# Patient Record
Sex: MALE | Race: WHITE | Employment: FULL TIME | URBAN - METROPOLITAN AREA
[De-identification: names, ages, dates, MRNs, and addresses within clinical notes are randomized per-mention and may not be internally consistent; named-entity substitution may affect disease eponyms.]

---

## 2017-05-13 ENCOUNTER — HOSPITAL ENCOUNTER (OUTPATIENT)
Age: 37
Discharge: HOME | End: 2017-05-13
Payer: COMMERCIAL

## 2017-05-13 VITALS
TEMPERATURE: 99 F | RESPIRATION RATE: 16 BRPM | OXYGEN SATURATION: 100 % | SYSTOLIC BLOOD PRESSURE: 107 MMHG | BODY MASS INDEX: 21.22 KG/M2 | HEIGHT: 68 IN | DIASTOLIC BLOOD PRESSURE: 73 MMHG | HEART RATE: 74 BPM | WEIGHT: 140 LBS

## 2017-05-13 DIAGNOSIS — B02.9 HERPES ZOSTER WITHOUT COMPLICATION: Primary | ICD-10-CM

## 2017-05-13 PROCEDURE — 99204 OFFICE O/P NEW MOD 45 MIN: CPT

## 2017-05-13 PROCEDURE — 99213 OFFICE O/P EST LOW 20 MIN: CPT

## 2017-05-13 RX ORDER — ACETAMINOPHEN AND CODEINE PHOSPHATE 300; 30 MG/1; MG/1
1-2 TABLET ORAL EVERY 4 HOURS PRN
Qty: 12 TABLET | Refills: 0 | Status: SHIPPED | OUTPATIENT
Start: 2017-05-13 | End: 2017-05-20

## 2017-05-13 RX ORDER — VALACYCLOVIR HYDROCHLORIDE 1 G/1
1 TABLET, FILM COATED ORAL 3 TIMES DAILY
Qty: 21 TABLET | Refills: 0 | Status: SHIPPED | OUTPATIENT
Start: 2017-05-13 | End: 2017-05-20

## 2017-05-13 NOTE — ED INITIAL ASSESSMENT (HPI)
Pt with raised red blister like rash on left anterior and posterior chest wall. Ariana JUAREZ at bedside. Reports occasional headache. Denies additional symptoms.

## 2017-05-13 NOTE — ED PROVIDER NOTES
Patient Seen in: [address] [place] [address]    History   Patient presents with:  Rash Skin Problem (integumentary)    Stated Complaint: Shingles    HPI    Patient is a 13-year-old male who presents for evaluation of rash to his right maureen Positive for stated complaint: Shingles  Other systems are as noted in HPI. Constitutional and vital signs reviewed. All other systems reviewed and negative except as noted above.     PSFH elements reviewed from today and agreed except as otherwise st VS, pt appears nontoxic. PE as above, c/w shingles. Will prescribe Valtrex, told patient because it has been 4 days he may not notice a difference with the medication. Requesting pain medication.   Limited Tylenol #3 dispensed, told to take at night, do n

## 2017-05-15 ENCOUNTER — TELEPHONE (OUTPATIENT)
Dept: FAMILY MEDICINE CLINIC | Facility: CLINIC | Age: 37
End: 2017-05-15

## 2017-05-15 NOTE — TELEPHONE ENCOUNTER
Dr.DBC Dr.CMC pt was Dx with Shingles at [facility] on 5/13. He wanted to know how contagious this was. Pt was inform its contact. The spread through direct contact with the fluid. Once he develops crust he is no longer contagious.  He was advised to avoid being a

## 2017-05-15 NOTE — TELEPHONE ENCOUNTER
Pt went to urgent care on sat may 13, 2017 and they advise he had shingles. Pt wants to know what he should do regarding having shingles. .. please advise

## 2017-05-16 NOTE — TELEPHONE ENCOUNTER
Alexander Reid pt was inform that he should wait for the Shingles vaccine until the age 49 pt verbalized understanding.     Alexander Reid pt stated that yesterday he started to feel the pain shooting down his right leg the same side as where he has the shingles is this no

## 2017-05-16 NOTE — TELEPHONE ENCOUNTER
Discussed Dr Scott 16 response with patient. Patient states he feels better this evening. He is to continue taking his valtrex and pain meds. If symptoms worsen, patient to f/u in office.

## 2019-08-08 ENCOUNTER — HOSPITAL ENCOUNTER (OUTPATIENT)
Dept: MRI IMAGING | Age: 39
Discharge: HOME | End: 2019-08-08
Attending: INTERNAL MEDICINE
Payer: COMMERCIAL

## 2019-08-08 DIAGNOSIS — G44.019 EPISODIC CLUSTER HEADACHE, NOT INTRACTABLE: ICD-10-CM

## 2019-08-08 PROCEDURE — 70551 MRI BRAIN STEM W/O DYE: CPT | Performed by: INTERNAL MEDICINE

## 2020-04-19 ENCOUNTER — HOSPITAL ENCOUNTER (EMERGENCY)
Age: 40
Discharge: HOME | End: 2020-04-19
Attending: EMERGENCY MEDICINE

## 2020-04-19 VITALS
HEART RATE: 76 BPM | DIASTOLIC BLOOD PRESSURE: 72 MMHG | SYSTOLIC BLOOD PRESSURE: 118 MMHG | RESPIRATION RATE: 16 BRPM | OXYGEN SATURATION: 100 % | BODY MASS INDEX: 21.91 KG/M2 | TEMPERATURE: 97.3 F | WEIGHT: 147.93 LBS | HEIGHT: 69 IN

## 2020-04-19 DIAGNOSIS — G43.909 MIGRAINE WITHOUT STATUS MIGRAINOSUS, NOT INTRACTABLE, UNSPECIFIED MIGRAINE TYPE: Primary | ICD-10-CM

## 2020-04-19 LAB
ALBUMIN SERPL-MCNC: 4.3 G/DL (ref 3.6–5.1)
ALBUMIN/GLOB SERPL: 1.2 {RATIO} (ref 1–2.4)
ALP SERPL-CCNC: 36 UNITS/L (ref 45–117)
ALT SERPL-CCNC: 35 UNITS/L
ANION GAP SERPL CALC-SCNC: 14 MMOL/L (ref 10–20)
AST SERPL-CCNC: 24 UNITS/L
BASOPHILS # BLD: 0.1 K/MCL (ref 0–0.3)
BASOPHILS NFR BLD: 1 %
BILIRUB SERPL-MCNC: 1.3 MG/DL (ref 0.2–1)
BUN SERPL-MCNC: 13 MG/DL (ref 6–20)
BUN/CREAT SERPL: 15 (ref 7–25)
CALCIUM SERPL-MCNC: 9.3 MG/DL (ref 8.4–10.2)
CHLORIDE SERPL-SCNC: 106 MMOL/L (ref 98–107)
CO2 SERPL-SCNC: 20 MMOL/L (ref 21–32)
CREAT SERPL-MCNC: 0.87 MG/DL (ref 0.67–1.17)
DIFFERENTIAL METHOD BLD: ABNORMAL
EOSINOPHIL # BLD: 0.1 K/MCL (ref 0.1–0.5)
EOSINOPHIL NFR BLD: 1 %
ERYTHROCYTE [DISTWIDTH] IN BLOOD: 16.6 % (ref 11–15)
GLOBULIN SER-MCNC: 3.5 G/DL (ref 2–4)
GLUCOSE SERPL-MCNC: 170 MG/DL (ref 65–99)
HCT VFR BLD CALC: 36 % (ref 39–51)
HGB BLD-MCNC: 11.6 G/DL (ref 13–17)
IMM GRANULOCYTES # BLD AUTO: 0 K/MCL (ref 0–0.2)
IMM GRANULOCYTES NFR BLD: 0 %
LYMPHOCYTES # BLD: 2.6 K/MCL (ref 1–4.8)
LYMPHOCYTES NFR BLD: 23 %
MCH RBC QN AUTO: 19.5 PG (ref 26–34)
MCHC RBC AUTO-ENTMCNC: 32.2 G/DL (ref 32–36.5)
MCV RBC AUTO: 60.4 FL (ref 78–100)
MONOCYTES # BLD: 0.7 K/MCL (ref 0.3–0.9)
MONOCYTES NFR BLD: 6 %
NEUTROPHILS # BLD: 7.8 K/MCL (ref 1.8–7.7)
NEUTROPHILS NFR BLD: 69 %
NRBC BLD MANUAL-RTO: 0 /100 WBC
PLATELET # BLD: 209 K/MCL (ref 140–450)
POTASSIUM SERPL-SCNC: 3.5 MMOL/L (ref 3.4–5.1)
PROT SERPL-MCNC: 7.8 G/DL (ref 6.4–8.2)
RBC # BLD: 5.96 MIL/MCL (ref 4.5–5.9)
SODIUM SERPL-SCNC: 136 MMOL/L (ref 135–145)
WBC # BLD: 11.2 K/MCL (ref 4.2–11)

## 2020-04-19 PROCEDURE — 99284 EMERGENCY DEPT VISIT MOD MDM: CPT

## 2020-04-19 PROCEDURE — 96361 HYDRATE IV INFUSION ADD-ON: CPT

## 2020-04-19 PROCEDURE — 85025 COMPLETE CBC W/AUTO DIFF WBC: CPT

## 2020-04-19 PROCEDURE — 10002800 HB RX 250 W HCPCS: Performed by: EMERGENCY MEDICINE

## 2020-04-19 PROCEDURE — 10002807 HB RX 258: Performed by: EMERGENCY MEDICINE

## 2020-04-19 PROCEDURE — 96375 TX/PRO/DX INJ NEW DRUG ADDON: CPT

## 2020-04-19 PROCEDURE — 96374 THER/PROPH/DIAG INJ IV PUSH: CPT

## 2020-04-19 PROCEDURE — 80053 COMPREHEN METABOLIC PANEL: CPT

## 2020-04-19 RX ORDER — DIPHENHYDRAMINE HYDROCHLORIDE 50 MG/ML
25 INJECTION INTRAMUSCULAR; INTRAVENOUS ONCE
Status: COMPLETED | OUTPATIENT
Start: 2020-04-19 | End: 2020-04-19

## 2020-04-19 RX ORDER — PROCHLORPERAZINE EDISYLATE 5 MG/ML
10 INJECTION INTRAMUSCULAR; INTRAVENOUS ONCE
Status: COMPLETED | OUTPATIENT
Start: 2020-04-19 | End: 2020-04-19

## 2020-04-19 RX ORDER — BUTALBITAL, ACETAMINOPHEN AND CAFFEINE 50; 325; 40 MG/1; MG/1; MG/1
1 TABLET ORAL EVERY 4 HOURS PRN
Qty: 12 TABLET | Refills: 0 | Status: SHIPPED | OUTPATIENT
Start: 2020-04-19

## 2020-04-19 RX ADMIN — PROCHLORPERAZINE EDISYLATE 10 MG: 5 INJECTION INTRAMUSCULAR; INTRAVENOUS at 08:30

## 2020-04-19 RX ADMIN — SODIUM CHLORIDE 1000 ML: 9 INJECTION, SOLUTION INTRAVENOUS at 08:29

## 2020-04-19 RX ADMIN — KETOROLAC TROMETHAMINE 15 MG: 30 INJECTION, SOLUTION INTRAMUSCULAR; INTRAVENOUS at 08:30

## 2020-04-19 RX ADMIN — DIPHENHYDRAMINE HYDROCHLORIDE 25 MG: 50 INJECTION, SOLUTION INTRAMUSCULAR; INTRAVENOUS at 08:30

## 2020-04-19 ASSESSMENT — ENCOUNTER SYMPTOMS
NAUSEA: 1
COUGH: 0
PHOTOPHOBIA: 1
HEADACHES: 1
PSYCHIATRIC NEGATIVE: 1
NUMBNESS: 1
CONSTITUTIONAL NEGATIVE: 1

## 2020-04-19 ASSESSMENT — PAIN SCALES - GENERAL
PAINLEVEL_OUTOF10: 0
PAINLEVEL_OUTOF10: 10

## 2021-02-08 ENCOUNTER — OFFICE VISIT (OUTPATIENT)
Dept: FAMILY MEDICINE CLINIC | Facility: CLINIC | Age: 41
End: 2021-02-08
Payer: COMMERCIAL

## 2021-02-08 VITALS
BODY MASS INDEX: 22.32 KG/M2 | SYSTOLIC BLOOD PRESSURE: 120 MMHG | WEIGHT: 149 LBS | HEART RATE: 83 BPM | DIASTOLIC BLOOD PRESSURE: 76 MMHG | HEIGHT: 68.5 IN

## 2021-02-08 DIAGNOSIS — G44.009 MIGRAINE-CLUSTER HEADACHE SYNDROME: ICD-10-CM

## 2021-02-08 DIAGNOSIS — Z80.0 FAMILY HISTORY OF COLON CANCER: ICD-10-CM

## 2021-02-08 DIAGNOSIS — B35.1 ONYCHOMYCOSIS: ICD-10-CM

## 2021-02-08 DIAGNOSIS — Z00.00 ROUTINE PHYSICAL EXAMINATION: Primary | ICD-10-CM

## 2021-02-08 PROCEDURE — 3074F SYST BP LT 130 MM HG: CPT | Performed by: FAMILY MEDICINE

## 2021-02-08 PROCEDURE — 3008F BODY MASS INDEX DOCD: CPT | Performed by: FAMILY MEDICINE

## 2021-02-08 PROCEDURE — 99385 PREV VISIT NEW AGE 18-39: CPT | Performed by: FAMILY MEDICINE

## 2021-02-08 PROCEDURE — 3078F DIAST BP <80 MM HG: CPT | Performed by: FAMILY MEDICINE

## 2021-02-08 PROCEDURE — 90715 TDAP VACCINE 7 YRS/> IM: CPT | Performed by: FAMILY MEDICINE

## 2021-02-08 PROCEDURE — 90471 IMMUNIZATION ADMIN: CPT | Performed by: FAMILY MEDICINE

## 2021-02-08 RX ORDER — SODIUM, POTASSIUM,MAG SULFATES 17.5-3.13G
SOLUTION, RECONSTITUTED, ORAL ORAL
Qty: 1 BOTTLE | Refills: 0 | Status: CANCELLED | OUTPATIENT
Start: 2021-02-08

## 2021-02-08 NOTE — PROGRESS NOTES
REASON FOR VISIT:    Guerita Hwang is a 45year old male who presents for an [address]. Feels well history of migraine cluster headaches. Has been the [facility] previously.   Has not had issues with his headache since la for: RPR   Hepatitis C Screening Screen pts at high risk plus screen one time for adults born [address]  No results found for: HCVAB   Tuberculosis Screen If high risk No components found for: [address] [facility] or P 120/76   Pulse 83   Ht 5' 8.5\" (1.74 m)   Wt 149 lb (67.6 kg)   BMI 22.33 kg/m²   > BP Readings from Last 3 Encounters:  02/08/21 : 120/76  05/13/17 : 107/73  10/04/16 : 112/75       GENERAL: well developed, well nourished, in no apparent distress   SKIN: years   HPV Males 11-21   Zoster (Shingles) 61 and older: one dose   Varicella 2 doses if not immune   MMR 1-2 doses if born after 26 and not immune   1.  Routine physical examination  Tetanus today  - LIPID PANEL  - TSH W REFLEX TO FREE T4  - GLUCOSE, SE

## 2021-02-08 NOTE — H&P
[address] [facility] [address] Gastroenterology                                                                                                  Clinic History and Physical     Pa Grandmother         lung   • Diabetes Maternal Grandfather    • Heart Disease Paternal Grandmother         CAD   • Diabetes Maternal Uncle       Social History: Social History    Tobacco Use      Smoking status: Never Smoker      Smokeless tobacco: Never U extremities   Psych: Pt has a normal mood and affect, behavior is normal    Nursing note and vitals reviewed      Labs/Imaging:     Patient's labs and imaging were reviewed and discussed with patient today. See HPI and A&P for further details.       ASSESS with the patient [who demonstrated understanding], including but not limited to the risks of bleeding, infection, pain, as well as the risks of anesthesia and perforation all leading to prolonged hospitalization or surgical intervention.  Miss rate of colon

## 2021-02-09 LAB
ALT: 26 U/L (ref 9–46)
AST: 16 U/L (ref 10–40)
CHOL/HDLC RATIO: 3 (CALC)
CHOLESTEROL, TOTAL: 127 MG/DL
GLUCOSE: 84 MG/DL (ref 65–139)
HDL CHOLESTEROL: 43 MG/DL
LDL-CHOLESTEROL: 72 MG/DL (CALC)
NON-HDL CHOLESTEROL: 84 MG/DL (CALC)
TRIGLYCERIDES: 43 MG/DL
TSH W/REFLEX TO FT4: 0.54 MIU/L (ref 0.4–4.5)

## 2021-02-22 ENCOUNTER — TELEPHONE (OUTPATIENT)
Dept: GASTROENTEROLOGY | Facility: CLINIC | Age: 41
End: 2021-02-22

## 2021-02-22 ENCOUNTER — OFFICE VISIT (OUTPATIENT)
Dept: GASTROENTEROLOGY | Facility: CLINIC | Age: 41
End: 2021-02-22
Payer: COMMERCIAL

## 2021-02-22 VITALS
BODY MASS INDEX: 21.22 KG/M2 | DIASTOLIC BLOOD PRESSURE: 71 MMHG | HEART RATE: 69 BPM | HEIGHT: 68 IN | WEIGHT: 140 LBS | SYSTOLIC BLOOD PRESSURE: 116 MMHG

## 2021-02-22 DIAGNOSIS — Z80.0 FHX: COLON CANCER: Primary | ICD-10-CM

## 2021-02-22 DIAGNOSIS — Z12.11 SCREENING FOR COLON CANCER: ICD-10-CM

## 2021-02-22 DIAGNOSIS — Z12.11 SCREEN FOR COLON CANCER: ICD-10-CM

## 2021-02-22 DIAGNOSIS — Z80.0 FAMILY HISTORY OF COLON CANCER: Primary | ICD-10-CM

## 2021-02-22 PROCEDURE — S0285 CNSLT BEFORE SCREEN COLONOSC: HCPCS | Performed by: NURSE PRACTITIONER

## 2021-02-22 PROCEDURE — 3078F DIAST BP <80 MM HG: CPT | Performed by: NURSE PRACTITIONER

## 2021-02-22 PROCEDURE — 3074F SYST BP LT 130 MM HG: CPT | Performed by: NURSE PRACTITIONER

## 2021-02-22 PROCEDURE — 3008F BODY MASS INDEX DOCD: CPT | Performed by: NURSE PRACTITIONER

## 2021-02-22 NOTE — TELEPHONE ENCOUNTER
Scheduled for:  Colonoscopy 19994  Provider Name:  Dr Luis Granados  Date:  04/27/2021  Location:  [facility]  Sedation:  MAC  Time:  0900 (pt is aware to arrive at 0800)    Prep:  Miralax  Meds/Allergies Reconciled?:  Physician reviewed  Diagnosis with codes:  Family h

## 2021-02-22 NOTE — PATIENT INSTRUCTIONS
-Schedule colonoscopy w/ Dr. Damian Parnell or Dr. Conner Pak with MAC  Dx: [facility] AT [place]  -Eligible for NE: No r/t use of cannabis   -Prep: Split dose  MiraLAX/Gatorade  -Anti-platelets and anti-coagulants: None  -Diabetes meds: None    ** If MAC @ [facility]/NE:    - HOLD ACE/

## 2021-04-07 ENCOUNTER — TELEPHONE (OUTPATIENT)
Dept: GASTROENTEROLOGY | Facility: CLINIC | Age: 41
End: 2021-04-07

## 2021-04-07 NOTE — TELEPHONE ENCOUNTER
Spouse spoke with [facility] - was told the CLN needs PA - they were also asking who the anesthesiologist  will be

## 2021-04-07 NOTE — TELEPHONE ENCOUNTER
Managed Care, can you please assist with obtaining a PA for a colonoscopy that is scheduled on  04/27.  Thank you

## 2021-04-08 NOTE — TELEPHONE ENCOUNTER
PPD, can you please assist with obtaining a PA for a colonoscopy. Patient is scheduled on  04/27.  Thank you

## 2021-04-08 NOTE — TELEPHONE ENCOUNTER
Please see referral #: [id number]. No PA required for CPT 44327 done at [facility] per [facility] (verified yesterday per [facility]). Referral has Decision ID if needed for patient records.

## 2021-04-08 NOTE — TELEPHONE ENCOUNTER
Patient's wife Greta Hartmann notified that no prior authorization is need for procedure per our PPD department.

## 2021-04-22 RX ORDER — CETIRIZINE HYDROCHLORIDE 10 MG/1
10 TABLET ORAL DAILY
COMMUNITY

## 2021-04-22 RX ORDER — GARLIC EXTRACT 500 MG
1 CAPSULE ORAL DAILY
COMMUNITY

## 2021-04-25 ENCOUNTER — LAB ENCOUNTER (OUTPATIENT)
Dept: LAB | Facility: HOSPITAL | Age: 41
End: 2021-04-25
Attending: INTERNAL MEDICINE
Payer: COMMERCIAL

## 2021-04-25 DIAGNOSIS — Z01.818 PRE-OP TESTING: ICD-10-CM

## 2021-04-26 ENCOUNTER — TELEPHONE (OUTPATIENT)
Dept: GASTROENTEROLOGY | Facility: CLINIC | Age: 41
End: 2021-04-26

## 2021-04-26 NOTE — TELEPHONE ENCOUNTER
Received call from answering service on priority line. Patient scheduled for procedure tomorrow and wanted to know if he could take Tylenol for headache. All questions answered and I reviewed clear liquid diet with the patient.

## 2021-04-27 ENCOUNTER — TELEPHONE (OUTPATIENT)
Dept: GASTROENTEROLOGY | Facility: CLINIC | Age: 41
End: 2021-04-27

## 2021-04-27 ENCOUNTER — ANESTHESIA EVENT (OUTPATIENT)
Dept: ENDOSCOPY | Age: 41
End: 2021-04-27
Payer: COMMERCIAL

## 2021-04-27 ENCOUNTER — ANESTHESIA (OUTPATIENT)
Dept: ENDOSCOPY | Age: 41
End: 2021-04-27
Payer: COMMERCIAL

## 2021-04-27 ENCOUNTER — HOSPITAL ENCOUNTER (OUTPATIENT)
Age: 41
Setting detail: HOSPITAL OUTPATIENT SURGERY
Discharge: HOME | End: 2021-04-27
Attending: INTERNAL MEDICINE | Admitting: INTERNAL MEDICINE
Payer: COMMERCIAL

## 2021-04-27 VITALS
WEIGHT: 140 LBS | HEIGHT: 68 IN | HEART RATE: 52 BPM | TEMPERATURE: 98 F | RESPIRATION RATE: 12 BRPM | DIASTOLIC BLOOD PRESSURE: 71 MMHG | BODY MASS INDEX: 21.22 KG/M2 | OXYGEN SATURATION: 100 % | SYSTOLIC BLOOD PRESSURE: 98 MMHG

## 2021-04-27 DIAGNOSIS — Z80.0 FHX: COLON CANCER: ICD-10-CM

## 2021-04-27 DIAGNOSIS — Z01.818 PRE-OP TESTING: Primary | ICD-10-CM

## 2021-04-27 DIAGNOSIS — Z12.11 SCREEN FOR COLON CANCER: ICD-10-CM

## 2021-04-27 PROCEDURE — 45378 DIAGNOSTIC COLONOSCOPY: CPT | Performed by: INTERNAL MEDICINE

## 2021-04-27 RX ORDER — SODIUM CHLORIDE, SODIUM LACTATE, POTASSIUM CHLORIDE, CALCIUM CHLORIDE 600; 310; 30; 20 MG/100ML; MG/100ML; MG/100ML; MG/100ML
INJECTION, SOLUTION INTRAVENOUS CONTINUOUS
Status: DISCONTINUED | OUTPATIENT
Start: 2021-04-27 | End: 2021-04-27

## 2021-04-27 RX ORDER — LIDOCAINE HYDROCHLORIDE 10 MG/ML
INJECTION, SOLUTION EPIDURAL; INFILTRATION; INTRACAUDAL; PERINEURAL AS NEEDED
Status: DISCONTINUED | OUTPATIENT
Start: 2021-04-27 | End: 2021-04-27 | Stop reason: SURG

## 2021-04-27 RX ADMIN — SODIUM CHLORIDE, SODIUM LACTATE, POTASSIUM CHLORIDE, CALCIUM CHLORIDE: 600; 310; 30; 20 INJECTION, SOLUTION INTRAVENOUS at 09:41:00

## 2021-04-27 RX ADMIN — SODIUM CHLORIDE, SODIUM LACTATE, POTASSIUM CHLORIDE, CALCIUM CHLORIDE: 600; 310; 30; 20 INJECTION, SOLUTION INTRAVENOUS at 09:12:00

## 2021-04-27 RX ADMIN — LIDOCAINE HYDROCHLORIDE 50 MG: 10 INJECTION, SOLUTION EPIDURAL; INFILTRATION; INTRACAUDAL; PERINEURAL at 09:12:00

## 2021-04-27 NOTE — ANESTHESIA PREPROCEDURE EVALUATION
Anesthesia PreOp Note    HPI:     Marine Clark is a 45year old male who presents for preoperative consultation requested by: Anjelica Dennis MD    Date of Surgery: 4/27/2021    Procedure(s):  COLONOSCOPY  Indication: FHx: colon cancer, Screen for c Spouse name: Not on file      Number of children: Not on file      Years of education: Not on file      Highest education level: Not on file    Occupational History      Not on file    Tobacco Use      Smoking status: Never Smoker      Smokeless tobacco: reviewed. Lab Results   Component Value Date    GLU 84 02/08/2021          Vital Signs: Body mass index is 21.29 kg/m². height is 1.727 m (5' 8\") and weight is 63.5 kg (140 lb). His tympanic temperature is 98.3 °F (36.8 °C).  His blood pressure is 1

## 2021-04-27 NOTE — H&P
History & Physical Examination    Patient Name: Francesco Farrell  MRN: [id number]  [facility]: [id number]  YOB: 1980    Diagnosis:     Family history of colon cancer  Colon cancer screening      TURMERIC OR, Take 1 tablet by mouth daily. , Disp: , R ] I have reviewed the History and Physical done within the last 30 days. Any changes noted above. Coleen Villarreal.  Maritza  4/27/2021  9:10 AM

## 2021-04-27 NOTE — TELEPHONE ENCOUNTER
Health maintenance updated. 5 year colonoscopy recall placed in patient outreach.  Next due on 04/27/2026 per Dr. Yani Andrea

## 2021-04-27 NOTE — ANESTHESIA POSTPROCEDURE EVALUATION
Patient: Francesco Farrell    Procedure Summary     Date: 04/27/21 Room / Location: [facility] ENDOSCOPY 01 / [facility] ENDO    Anesthesia Start: 0908 Anesthesia Stop: 7573    Procedure: COLONOSCOPY (N/A ) Diagnosis:       FHx: colon cancer      Screen for colon ca

## 2021-04-27 NOTE — OPERATIVE REPORT
[facility] Endoscopy Report      Preoperative Diagnosis:  - family history of colon cancer  - colon cancer screening      Postoperative Diagnosis:  - normal colonoscopy       Procedure:    Colonoscopy       Surgeon:  Eloina Lin M.D.

## 2021-06-03 ENCOUNTER — HOSPITAL ENCOUNTER (OUTPATIENT)
Age: 41
Discharge: HOME | End: 2021-06-03
Payer: COMMERCIAL

## 2021-06-03 VITALS
RESPIRATION RATE: 16 BRPM | SYSTOLIC BLOOD PRESSURE: 114 MMHG | OXYGEN SATURATION: 100 % | HEART RATE: 66 BPM | TEMPERATURE: 98 F | DIASTOLIC BLOOD PRESSURE: 68 MMHG

## 2021-06-03 DIAGNOSIS — J02.9 PHARYNGITIS, UNSPECIFIED ETIOLOGY: Primary | ICD-10-CM

## 2021-06-03 PROCEDURE — 87880 STREP A ASSAY W/OPTIC: CPT

## 2021-06-03 PROCEDURE — 99203 OFFICE O/P NEW LOW 30 MIN: CPT

## 2021-06-03 PROCEDURE — 99213 OFFICE O/P EST LOW 20 MIN: CPT

## 2021-06-03 RX ORDER — AMOXICILLIN 875 MG/1
875 TABLET, COATED ORAL 2 TIMES DAILY
Qty: 20 TABLET | Refills: 0 | Status: SHIPPED | OUTPATIENT
Start: 2021-06-03 | End: 2021-06-13

## 2021-06-03 NOTE — ED INITIAL ASSESSMENT (HPI)
2 days of sore throat. No fever. States his kids were dx with strep last week. Fully vaccinated for Covid.

## 2021-12-07 ENCOUNTER — IMMUNIZATION (OUTPATIENT)
Dept: LAB | Facility: HOSPITAL | Age: 41
End: 2021-12-07
Attending: EMERGENCY MEDICINE
Payer: COMMERCIAL

## 2021-12-07 DIAGNOSIS — Z23 NEED FOR VACCINATION: Primary | ICD-10-CM

## 2021-12-07 PROCEDURE — 0004A SARSCOV2 VAC 30MCG/0.3ML IM: CPT

## 2022-03-15 ENCOUNTER — OFFICE VISIT (OUTPATIENT)
Dept: FAMILY MEDICINE CLINIC | Facility: CLINIC | Age: 42
End: 2022-03-15
Payer: COMMERCIAL

## 2022-03-15 ENCOUNTER — LAB ENCOUNTER (OUTPATIENT)
Dept: LAB | Age: 42
End: 2022-03-15
Attending: FAMILY MEDICINE
Payer: COMMERCIAL

## 2022-03-15 VITALS
DIASTOLIC BLOOD PRESSURE: 74 MMHG | HEIGHT: 68 IN | HEART RATE: 73 BPM | SYSTOLIC BLOOD PRESSURE: 116 MMHG | BODY MASS INDEX: 21.37 KG/M2 | WEIGHT: 141 LBS

## 2022-03-15 DIAGNOSIS — Z83.2 FAMILY HISTORY OF FACTOR V LEIDEN MUTATION: ICD-10-CM

## 2022-03-15 DIAGNOSIS — M25.561 RIGHT KNEE PAIN, UNSPECIFIED CHRONICITY: ICD-10-CM

## 2022-03-15 DIAGNOSIS — Z00.00 ROUTINE PHYSICAL EXAMINATION: ICD-10-CM

## 2022-03-15 DIAGNOSIS — Z00.00 ROUTINE PHYSICAL EXAMINATION: Primary | ICD-10-CM

## 2022-03-15 PROBLEM — D68.51 FACTOR V LEIDEN (HCC): Status: ACTIVE | Noted: 2022-03-15

## 2022-03-15 LAB
CHOLEST SERPL-MCNC: 145 MG/DL (ref ?–200)
FASTING PATIENT GLUCOSE ANSWER: YES
FASTING PATIENT LIPID ANSWER: YES
GLUCOSE BLD-MCNC: 107 MG/DL (ref 70–99)
HDLC SERPL-MCNC: 46 MG/DL (ref 40–59)
LDLC SERPL CALC-MCNC: 90 MG/DL (ref ?–100)
NONHDLC SERPL-MCNC: 99 MG/DL (ref ?–130)
TRIGL SERPL-MCNC: 38 MG/DL (ref 30–149)
VLDLC SERPL CALC-MCNC: 6 MG/DL (ref 0–30)

## 2022-03-15 PROCEDURE — 3074F SYST BP LT 130 MM HG: CPT | Performed by: FAMILY MEDICINE

## 2022-03-15 PROCEDURE — 90686 IIV4 VACC NO PRSV 0.5 ML IM: CPT | Performed by: FAMILY MEDICINE

## 2022-03-15 PROCEDURE — 81241 F5 GENE: CPT

## 2022-03-15 PROCEDURE — 3078F DIAST BP <80 MM HG: CPT | Performed by: FAMILY MEDICINE

## 2022-03-15 PROCEDURE — 90471 IMMUNIZATION ADMIN: CPT | Performed by: FAMILY MEDICINE

## 2022-03-15 PROCEDURE — 80061 LIPID PANEL: CPT

## 2022-03-15 PROCEDURE — 36415 COLL VENOUS BLD VENIPUNCTURE: CPT

## 2022-03-15 PROCEDURE — 82947 ASSAY GLUCOSE BLOOD QUANT: CPT

## 2022-03-15 PROCEDURE — 99396 PREV VISIT EST AGE 40-64: CPT | Performed by: FAMILY MEDICINE

## 2022-03-15 PROCEDURE — 3008F BODY MASS INDEX DOCD: CPT | Performed by: FAMILY MEDICINE

## 2022-03-17 ENCOUNTER — HOSPITAL ENCOUNTER (OUTPATIENT)
Dept: GENERAL RADIOLOGY | Age: 42
Discharge: HOME | End: 2022-03-17
Attending: FAMILY MEDICINE
Payer: COMMERCIAL

## 2022-03-17 DIAGNOSIS — M25.561 RIGHT KNEE PAIN, UNSPECIFIED CHRONICITY: ICD-10-CM

## 2022-03-17 PROCEDURE — 73562 X-RAY EXAM OF KNEE 3: CPT | Performed by: FAMILY MEDICINE

## 2023-02-05 ENCOUNTER — IMMUNIZATION (OUTPATIENT)
Dept: LAB | Age: 43
End: 2023-02-05
Attending: EMERGENCY MEDICINE
Payer: COMMERCIAL

## 2023-02-05 DIAGNOSIS — Z23 NEED FOR VACCINATION: Primary | ICD-10-CM

## 2023-02-05 PROCEDURE — 0124A SARSCOV2 VAC BVL 30MCG/0.3ML: CPT

## 2023-10-03 ENCOUNTER — LAB ENCOUNTER (OUTPATIENT)
Dept: LAB | Age: 43
End: 2023-10-03
Attending: FAMILY MEDICINE
Payer: COMMERCIAL

## 2023-10-03 ENCOUNTER — OFFICE VISIT (OUTPATIENT)
Dept: FAMILY MEDICINE CLINIC | Facility: CLINIC | Age: 43
End: 2023-10-03

## 2023-10-03 VITALS
HEIGHT: 68 IN | HEART RATE: 81 BPM | BODY MASS INDEX: 22.58 KG/M2 | SYSTOLIC BLOOD PRESSURE: 101 MMHG | WEIGHT: 149 LBS | DIASTOLIC BLOOD PRESSURE: 64 MMHG

## 2023-10-03 DIAGNOSIS — Z00.00 ROUTINE PHYSICAL EXAMINATION: Primary | ICD-10-CM

## 2023-10-03 DIAGNOSIS — R73.01 IFG (IMPAIRED FASTING GLUCOSE): ICD-10-CM

## 2023-10-03 DIAGNOSIS — D68.51 FACTOR V LEIDEN (HCC): ICD-10-CM

## 2023-10-03 LAB
CHOLEST SERPL-MCNC: 147 MG/DL (ref ?–200)
EST. AVERAGE GLUCOSE BLD GHB EST-MCNC: 100 MG/DL (ref 68–126)
FASTING PATIENT GLUCOSE ANSWER: YES
FASTING PATIENT LIPID ANSWER: YES
GLUCOSE BLD-MCNC: 107 MG/DL (ref 70–99)
HBA1C MFR BLD: 5.1 % (ref ?–5.7)
HDLC SERPL-MCNC: 43 MG/DL (ref 40–59)
LDLC SERPL CALC-MCNC: 90 MG/DL (ref ?–100)
NONHDLC SERPL-MCNC: 104 MG/DL (ref ?–130)
TRIGL SERPL-MCNC: 68 MG/DL (ref 30–149)
VLDLC SERPL CALC-MCNC: 11 MG/DL (ref 0–30)

## 2023-10-03 PROCEDURE — 99396 PREV VISIT EST AGE 40-64: CPT | Performed by: FAMILY MEDICINE

## 2023-10-03 PROCEDURE — 80061 LIPID PANEL: CPT | Performed by: FAMILY MEDICINE

## 2023-10-03 PROCEDURE — 90471 IMMUNIZATION ADMIN: CPT | Performed by: FAMILY MEDICINE

## 2023-10-03 PROCEDURE — 3074F SYST BP LT 130 MM HG: CPT | Performed by: FAMILY MEDICINE

## 2023-10-03 PROCEDURE — 36415 COLL VENOUS BLD VENIPUNCTURE: CPT | Performed by: FAMILY MEDICINE

## 2023-10-03 PROCEDURE — 3078F DIAST BP <80 MM HG: CPT | Performed by: FAMILY MEDICINE

## 2023-10-03 PROCEDURE — 82947 ASSAY GLUCOSE BLOOD QUANT: CPT | Performed by: FAMILY MEDICINE

## 2023-10-03 PROCEDURE — 90686 IIV4 VACC NO PRSV 0.5 ML IM: CPT | Performed by: FAMILY MEDICINE

## 2023-10-03 PROCEDURE — 3008F BODY MASS INDEX DOCD: CPT | Performed by: FAMILY MEDICINE

## 2023-10-03 PROCEDURE — 83036 HEMOGLOBIN GLYCOSYLATED A1C: CPT | Performed by: FAMILY MEDICINE

## 2023-10-05 ENCOUNTER — PATIENT MESSAGE (OUTPATIENT)
Dept: FAMILY MEDICINE CLINIC | Facility: CLINIC | Age: 43
End: 2023-10-05

## 2023-10-06 NOTE — TELEPHONE ENCOUNTER
View All Conversations on this Encounter                 Patient Communication     Append Comments   Seen Back to Top    Fasting sugar slightly elevated otherwise labs normal.   Written by Alyssia Fernandez DO on 10/3/2023  5:04 PM CDT  Seen by patient Christopher Graf on 10/3/2023  9:48 PM

## 2024-08-06 ENCOUNTER — OFFICE VISIT (OUTPATIENT)
Dept: FAMILY MEDICINE CLINIC | Facility: CLINIC | Age: 44
End: 2024-08-06
Payer: COMMERCIAL

## 2024-08-06 VITALS
HEART RATE: 71 BPM | RESPIRATION RATE: 17 BRPM | SYSTOLIC BLOOD PRESSURE: 126 MMHG | DIASTOLIC BLOOD PRESSURE: 81 MMHG | WEIGHT: 148 LBS | BODY MASS INDEX: 22.43 KG/M2 | HEIGHT: 68 IN

## 2024-08-06 DIAGNOSIS — M54.6 CHRONIC THORACIC BACK PAIN, UNSPECIFIED BACK PAIN LATERALITY: Primary | ICD-10-CM

## 2024-08-06 DIAGNOSIS — G89.29 CHRONIC THORACIC BACK PAIN, UNSPECIFIED BACK PAIN LATERALITY: Primary | ICD-10-CM

## 2024-08-06 PROCEDURE — 99213 OFFICE O/P EST LOW 20 MIN: CPT | Performed by: FAMILY MEDICINE

## 2024-08-06 NOTE — PROGRESS NOTES
Blood pressure 126/81, pulse 71, resp. rate 17, height 5' 8\" (1.727 m), weight 148 lb (67.1 kg).          Patient presents today complaining of intermittent chronic midthoracic back pain.  Reports he drives up to an hour to work at times.  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  He denies any recent traumatic injuries.  No pain down the legs some pain down the arms.  Denies any bowel or bladder dysfunction.    Currently has no pain.    Objective    Back without CVA tenderness    No spinous process tenderness    Assessment chronic musculoskeletal thoracic back pain    Plan ergonomic information given also    Physical therapy referral entered    Follow-up if no improvement

## 2024-09-03 ENCOUNTER — TELEPHONE (OUTPATIENT)
Dept: PHYSICAL THERAPY | Facility: HOSPITAL | Age: 44
End: 2024-09-03

## 2024-09-04 ENCOUNTER — OFFICE VISIT (OUTPATIENT)
Dept: PHYSICAL THERAPY | Age: 44
End: 2024-09-04
Attending: FAMILY MEDICINE
Payer: COMMERCIAL

## 2024-09-04 DIAGNOSIS — G89.29 CHRONIC THORACIC BACK PAIN, UNSPECIFIED BACK PAIN LATERALITY: Primary | ICD-10-CM

## 2024-09-04 DIAGNOSIS — M54.6 CHRONIC THORACIC BACK PAIN, UNSPECIFIED BACK PAIN LATERALITY: Primary | ICD-10-CM

## 2024-09-04 PROCEDURE — 97530 THERAPEUTIC ACTIVITIES: CPT

## 2024-09-04 PROCEDURE — 97110 THERAPEUTIC EXERCISES: CPT

## 2024-09-04 PROCEDURE — 97161 PT EVAL LOW COMPLEX 20 MIN: CPT

## 2024-09-04 NOTE — PROGRESS NOTES
SPINE EVALUATION:     Diagnosis:   Chronic thoracic back pain, unspecified back pain laterality (M54.6,G89.29)       Referring Provider: Nathan Sandra  Date of Evaluation:    9/4/2024    Precautions:  None Next MD visit:   none scheduled  Date of Surgery: n/a     PATIENT SUMMARY   Walter Espino is a 44 year old male who presents to therapy today with complaints of right sided thoracic pain, insidious onset approximately 8-10 years ago. Progressively worse as it's present more often. Massage once a month helps temporarily. Pt describes discomfort as tightness, but can be sharp. - cough/sneeze. Comes and goes day to day.   Minimal working out, no weights, more cardio if anything.   Pt describes pain level current 2/10, at best 0/10, at worst 7/10. Worse with mornings intermittently or after prolonged sitting.  Better standing.  Current functional limitations include tolerates sitting 1/2 way through work day, washing dishes 20 minutes.     Walter describes prior level of function indep with all ADLs. Pt goals include \"try to get relief from back pain. Help get me in routine of exercises.\" Per pt  Past medical history was reviewed with Walter. Significant findings include none.  Pt denies diplopia, dysarthria, dysphasia, dizziness, drop attacks, bowel/bladder changes, saddle anesthesia, and CHEL LE N/T.    ASSESSMENT  Walter presents to physical therapy evaluation with primary c/o right sided thoracic back pain. The results of the objective tests and measures show mm tightness, weakness of right scapular stabilizers and postural mms, decreased lumbar lordosis.  Functional deficits include but are not limited to sitting, washing dishes.  Signs and symptoms are consistent with diagnosis of chronic thoracic back pain, soft tissue dysfunction. Pt and PT discussed evaluation findings, pathology, POC and HEP.  Pt voiced understanding and performs HEP correctly without reported pain. Skilled Physical Therapy is  medically necessary to address the above impairments and reach functional goals.     OBJECTIVE:   Observation/Posture: right handed.   Neuro Screen: Intact    Lumbar AROM: (* denotes performed with pain)  Flexion: WNL  Extension: Min decrease  Sidebending: R WNL; L WNL  Rotation: R WNL; L WNL    Accessory motion: NT  Palpation: TTP and spasm of right lower trapezius.    Strength: (* denotes performed with pain)  UE/Scapular   Shoulder Flex: R 5-/5, L 5/5  Shoulder ABD (C5): R 5-/5, L 5/5  Biceps (C6): R 5-/5, L 5/5  Wrist ext (C6): R NT/5, L NT/5  Triceps (C7): R 5-/5, L 5/5  Wrist Flex (C7): R NT/5, L NT/5  Digit Flex (C8): R NT/5, L NT/5  Thumb Ext (C8): R NT/5, L NT/5  Interossei (T1): R NT/5, L NT/5    Rhomboids: R 4/5, L 4+/5  Mid trap: R 4/5; L 4+/5  Lats: R 4/5, L 4+/5  Low trap: R 4/5; L 4+/5     Strength: R NT lbs; L NT lbs     Flexibility:   UE/Scapular   Upper Trap: R WFL; L WFL  Levator Scap: R WFL; L WFL  Pec Major: R NT; L NT  Scalenes: R NT, L NT     Special tests:   NT    Gait: pt ambulates on level ground with normal mechanics.    Today’s Treatment and Response:   Pt education was provided on exam findings, treatment diagnosis, treatment plan, expectations, and prognosis. Pt was also provided recommendations for activity modifications, modalities as needed [ice/heat], postural corrections, and importance of remaining active, ideas of how to incorporate HEP into daily life.  Patient was instructed in and issued a HEP for: seated FB/L rot stretch, prone and seated scap retr, tennis ball TPR.    Charges: PT Eval Low Complexity, TE, TA      Total Timed Treatment: 20 min     Total Treatment Time: 40 min     Based on clinical rationale and outcome measures, this evaluation involved Low Complexity decision making due to 1-2 personal factors/comorbidities, 3 body structures involved/activity limitations, and evolving symptoms including changing pain levels.  PLAN OF CARE:    Goals: (to be met in 10  visits)   - Pt indep with HEP 5 of 7 days a week.  - Pt demonstrates good posture throughout PT session without cues from PT.  - Scapular stabilizer MMT to increase by 1/2 to full grade to allow for sitting through 1/2 of workday with min c/o at worst.  - Good flexibility of right lower trap to aide in washing dishes x 20-30 minutes with min c/o at worst.    Frequency / Duration: Patient will be seen for 1-2 x/week or a total of 10 visits over a 90 day period. Treatment will include: Manual Therapy, Neuromuscular Re-education, Self-Care Home Management, Therapeutic Activities, Therapeutic Exercise, Home Exercise Program instruction, and Modalities to include: Electrical stimulation (unattended)    Education or treatment limitation: None  Rehab Potential:good    Oswestry Disability Index Score  Score: 12 % (9/4/2024 11:18 AM)      Patient/Family/Caregiver was advised of these findings, precautions, and treatment options and has agreed to actively participate in planning and for this course of care.    Thank you for your referral. Please co-sign or sign and return this letter via fax as soon as possible to [phone]. If you have any questions, please contact me at Dept: [phone]    Sincerely,  Electronically signed by therapist: Kate Hanson, PT    Physician's certification required: Yes  I certify the need for these services furnished under this plan of treatment and while under my care.    X___________________________________________________ Date____________________    Certification From: 9/4/2024  To:12/3/2024

## 2024-09-11 ENCOUNTER — TELEPHONE (OUTPATIENT)
Dept: PHYSICAL THERAPY | Age: 44
End: 2024-09-11

## 2024-09-12 ENCOUNTER — APPOINTMENT (OUTPATIENT)
Dept: PHYSICAL THERAPY | Age: 44
End: 2024-09-12
Attending: FAMILY MEDICINE
Payer: COMMERCIAL

## 2024-09-17 ENCOUNTER — APPOINTMENT (OUTPATIENT)
Dept: PHYSICAL THERAPY | Age: 44
End: 2024-09-17
Attending: FAMILY MEDICINE
Payer: COMMERCIAL

## 2024-09-19 ENCOUNTER — APPOINTMENT (OUTPATIENT)
Dept: PHYSICAL THERAPY | Age: 44
End: 2024-09-19
Attending: FAMILY MEDICINE
Payer: COMMERCIAL

## 2024-09-24 ENCOUNTER — APPOINTMENT (OUTPATIENT)
Dept: PHYSICAL THERAPY | Age: 44
End: 2024-09-24
Attending: FAMILY MEDICINE
Payer: COMMERCIAL

## 2024-09-26 ENCOUNTER — OFFICE VISIT (OUTPATIENT)
Dept: PHYSICAL THERAPY | Age: 44
End: 2024-09-26
Attending: FAMILY MEDICINE
Payer: COMMERCIAL

## 2024-09-26 PROCEDURE — 97112 NEUROMUSCULAR REEDUCATION: CPT

## 2024-09-26 PROCEDURE — 97140 MANUAL THERAPY 1/> REGIONS: CPT

## 2024-09-26 PROCEDURE — 97110 THERAPEUTIC EXERCISES: CPT

## 2024-09-26 NOTE — PROGRESS NOTES
Diagnosis:   Chronic thoracic back pain, unspecified back pain laterality (M54.6,G89.29)      Referring Provider: Nathan Sandra  Date of Evaluation:    9/4/24    Precautions:  None Next MD visit:   none scheduled  Date of Surgery: n/a   Insurance Primary/Secondary: [employer] [facility] / N/A     # Auth Visits: 2/10            Subjective: Pt reports completing his HEP daily, but hasn't been using the tennis ball. \"Leaning forward to wash the dishes brings on the pain, driving home/twisted a little bit.\" Per pt    Pain: 1-2/10      Objective: See table for program.  Reviewed HEP issued at eval: seated FB/L rot stretch, prone and seated scap retr, tennis ball TPR.      Assessment: Palpable spasms noted right lower trap.      Goals:   (to be met in 10 visits)   - Pt indep with HEP 5 of 7 days a week.  - Pt demonstrates good posture throughout PT session without cues from PT.  - Scapular stabilizer MMT to increase by 1/2 to full grade to allow for sitting through 1/2 of workday with min c/o at worst.  - Good flexibility of right lower trap to aide in washing dishes x 20-30 minutes with min c/o at worst.    Plan: Advance functional strengthening and flexibility.  Date: 9/26/2024  TX#: 2/10 Date:                 TX#: 3/ Date:                 TX#: 4/ Date:                 TX#: 5/ Date:   Tx#: 6/   TE: 15 min  Seated FB/L rot stretch 15 sec x 5  UBE f/b x 2.5 min each  Prone over pillows thoraic ext x 10       NM: 15 min  Scap retraction prone 3 sec hold x 20  Prone I x 20  RTB ER x 20  Lat pull Blue TB x 20       MM: 12 min  Review of tennis ball TPR  STM/TPR R lower trap              HEP: Lat pull, ER, prone over pillows    Charges: MM, NM, TE       Total Timed Treatment: 42 min  Total Treatment Time: 45 min

## 2024-10-01 ENCOUNTER — OFFICE VISIT (OUTPATIENT)
Dept: PHYSICAL THERAPY | Age: 44
End: 2024-10-01
Attending: FAMILY MEDICINE
Payer: COMMERCIAL

## 2024-10-01 PROCEDURE — 97110 THERAPEUTIC EXERCISES: CPT

## 2024-10-01 PROCEDURE — 97112 NEUROMUSCULAR REEDUCATION: CPT

## 2024-10-01 PROCEDURE — 97140 MANUAL THERAPY 1/> REGIONS: CPT

## 2024-10-01 NOTE — PROGRESS NOTES
Diagnosis:   Chronic thoracic back pain, unspecified back pain laterality (M54.6,G89.29)      Referring Provider: Nathan Sandra  Date of Evaluation:    9/4/24    Precautions:  None Next MD visit:   none scheduled  Date of Surgery: n/a   Insurance Primary/Secondary: [employer] / N/A     # Auth Visits: 3/10            Subjective: Pt reports feeling some work out soreness from last visit, but no exacerbation of symptoms. \"I feel like it's not as bad since i've started completing the HEP.\" Per pt    Pain: 1/10      Objective: See table for program.  Reviewed HEP issued at previous appt: Lat pull, ER, prone over pillows thoracic ext.      Assessment: Pt tolerated increased sets this visit. No pain.      Goals:   (to be met in 10 visits)   - Pt indep with HEP 5 of 7 days a week.  - Pt demonstrates good posture throughout PT session without cues from PT.  - Scapular stabilizer MMT to increase by 1/2 to full grade to allow for sitting through 1/2 of workday with min c/o at worst.  - Good flexibility of right lower trap to aide in washing dishes x 20-30 minutes with min c/o at worst.    Plan: Advance functional strengthening and flexibility.  Date: 9/26/2024  TX#: 2/10 Date: 10/1/24                TX#: 3/10 Date:                 TX#: 4/ Date:                 TX#: 5/ Date:   Tx#: 6/   TE: 15 min  Seated FB/L rot stretch 15 sec x 5  UBE f/b x 2.5 min each  Prone over pillows thoraic ext x 10 TE: 15 min  Seated FB/L rot stretch 30 sec   UBE f/b x 3 min each  Prone over SB thoraic ext x 20   NM: 15 min  RTB ER x 2/20  Lat pull Blue TB x 2/20   MM: 8 min  STM/TPR R lower trap         NM: 15 min  Scap retraction prone 3 sec hold x 20  Prone I x 20  RTB ER x 20  Lat pull Blue TB x 20       MM: 12 min  Review of tennis ball TPR  STM/TPR R lower trap              HEP: Lat pull, ER, prone over pillows    Charges: MM, NM, TE       Total Timed Treatment: 42 min  Total Treatment Time: 45 min

## 2024-10-03 ENCOUNTER — APPOINTMENT (OUTPATIENT)
Dept: PHYSICAL THERAPY | Age: 44
End: 2024-10-03
Attending: FAMILY MEDICINE
Payer: COMMERCIAL

## 2024-10-07 ENCOUNTER — APPOINTMENT (OUTPATIENT)
Dept: PHYSICAL THERAPY | Age: 44
End: 2024-10-07
Attending: FAMILY MEDICINE
Payer: COMMERCIAL

## 2024-10-09 ENCOUNTER — OFFICE VISIT (OUTPATIENT)
Dept: PHYSICAL THERAPY | Age: 44
End: 2024-10-09
Attending: FAMILY MEDICINE
Payer: COMMERCIAL

## 2024-10-09 PROCEDURE — 97140 MANUAL THERAPY 1/> REGIONS: CPT

## 2024-10-09 PROCEDURE — 97112 NEUROMUSCULAR REEDUCATION: CPT

## 2024-10-09 PROCEDURE — 97110 THERAPEUTIC EXERCISES: CPT

## 2024-10-09 NOTE — PROGRESS NOTES
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 Pt notes some increased pain/discomfort the past few days. \"I carry it like a briefcase while I walk a mile each way from the train. Otherwise I feel like the exercises have been helping.\" Per pt  Pt reports doing a set of his HEP prior to his appt this morning.    Pain: 0/10      Objective: See table for program.      Assessment: Pt tolerated additional strengthening, functional training this visit. Continued spasms noted. No pain after session.      Goals:   (to be met in 10 visits)   - Pt indep with HEP 5 of 7 days a week.  - Pt demonstrates good posture throughout PT session without cues from PT.  - Scapular stabilizer MMT to increase by 1/2 to full grade to allow for sitting through 1/2 of workday with min c/o at worst.  - Good flexibility of right lower trap to aide in washing dishes x 20-30 minutes with min c/o at worst.    Plan: Advance functional strengthening and flexibility. To attempt kinesiotape next visit.  Date: 9/26/2024  TX#: 2/10 Date: 10/1/24                TX#: 3/10 Date: 10/9/24                TX#: 4/10 Date:                 TX#: 5/ Date:   Tx#: 6/   TE: 15 min  Seated FB/L rot stretch 15 sec x 5  UBE f/b x 2.5 min each  Prone over pillows thoraic ext x 10 TE: 15 min  Seated FB/L rot stretch 30 sec   UBE f/b x 3 min each  Prone over SB thoraic ext x 20   NM: 15 min  RTB ER x 2/20  Lat pull Blue TB x 2/20   MM: 8 min  STM/TPR R lower trap    TE: 15 min  Standing 20 lb Side bending x 10  Standing 20 lb row 2/10  Seated FB/L rot stretch 30 sec   UBE f/b lv 1 x 3 min each  Prone over  SB thoraic ext x 20   NM: 15 min  Lat pull Blue TB x 20  Blue TB rows x 20   Counter push ups wide and narrow stances x 2/10 each   MM: 8 min  STM/TPR R lower trap        NM: 15 min  Scap retraction prone 3 sec hold x 20  Prone I x 20  RTB ER x 20  Lat pull Blue TB x 20       MM: 12 min  Review of tennis ball TPR  STM/TPR R lower trap              HEP: Lat pull, ER, prone over pillows    Charges: MM, NM, TE       Total Timed Treatment: 42 min  Total Treatment Time: 45 min

## 2024-10-14 ENCOUNTER — OFFICE VISIT (OUTPATIENT)
Dept: PHYSICAL THERAPY | Age: 44
End: 2024-10-14
Attending: FAMILY MEDICINE
Payer: COMMERCIAL

## 2024-10-14 PROCEDURE — 97110 THERAPEUTIC EXERCISES: CPT

## 2024-10-14 PROCEDURE — 97112 NEUROMUSCULAR REEDUCATION: CPT

## 2024-10-14 PROCEDURE — 97140 MANUAL THERAPY 1/> REGIONS: CPT

## 2024-10-14 NOTE — PROGRESS NOTES
Diagnosis:   Chronic thoracic back pain, unspecified back pain laterality (M54.6,G89.29)      Referring Provider: Nathan Sandra  Date of Evaluation:    9/4/24    Precautions:  None Next MD visit:   none scheduled  Date of Surgery: n/a   Insurance Primary/Secondary: [employer] / N/A     # Auth Visits: 5/10            Subjective: Pt reports that he felt good after last appt and has been completing his additional HEP since last visit. \"Feels pretty good. Overall I feel like it's a lot better since completing HEP regularly.\" Per pt  Pt reports flare ups are less often. Most recent flare up when lifting cart and working trial a week or two ago.     Pain: 0/10      Objective: See table for program.      Assessment: Pt with increasing strength and tolerance to increased resistance. Overall reducing pain frequency.      Goals:   (to be met in 10 visits)   - Pt indep with HEP 5 of 7 days a week.  - Pt demonstrates good posture throughout PT session without cues from PT.  - Scapular stabilizer MMT to increase by 1/2 to full grade to allow for sitting through 1/2 of workday with min c/o at worst.  - Good flexibility of right lower trap to aide in washing dishes x 20-30 minutes with min c/o at worst.    Plan: Advance functional strengthening and flexibility. To attempt kinesiotape next visit.  Date: 10/9/24                TX#: 4/10 Date:   10/14/24              TX#: 5/10 Date:   Tx#: 6/   TE: 15 min  Standing 20 lb Side bending x 10  Standing 20 lb row 2/10  Seated FB/L rot stretch 30 sec   UBE f/b lv 1 x 3 min each  Prone over SB thoraic ext x 20   NM: 15 min  Lat pull Blue TB x 20  Blue TB rows x 20   Counter push ups wide and narrow stances x 2/10 each   MM: 8 min  STM/TPR R lower trap    TE: 12 min  Standing 20 lb row 2/10  Seated FB/L rot stretch 30 sec   UBE f/b lv 1 x 3 min each  Prone over SB thoraic ext x 2/20   NM: 15 min  Lat pull Black TB x 20  Black TB rows x 20   Counter push ups wide and narrow  stances x 2/10 each   MM: 15 min  STM/TPR R lower trap  Kinesiotape to thoracic paraspinals. Tape education and instruction on removal.                   HEP: Lat pull, ER, prone over pillows    Charges: MM, NM, TE       Total Timed Treatment: 42 min  Total Treatment Time: 45 min

## 2024-10-17 ENCOUNTER — APPOINTMENT (OUTPATIENT)
Dept: PHYSICAL THERAPY | Age: 44
End: 2024-10-17
Attending: FAMILY MEDICINE
Payer: COMMERCIAL

## 2024-10-30 ENCOUNTER — OFFICE VISIT (OUTPATIENT)
Dept: PHYSICAL THERAPY | Age: 44
End: 2024-10-30
Attending: FAMILY MEDICINE
Payer: COMMERCIAL

## 2024-10-30 PROCEDURE — 97530 THERAPEUTIC ACTIVITIES: CPT

## 2024-10-30 PROCEDURE — 97140 MANUAL THERAPY 1/> REGIONS: CPT

## 2024-10-30 NOTE — PROGRESS NOTES
Diagnosis:   Chronic thoracic back pain, unspecified back pain laterality (M54.6,G89.29)      Referring Provider: Nathan Sandra  Date of Evaluation:    9/4/24    Precautions:  None Next MD visit:   none scheduled  Date of Surgery: n/a   Insurance Primary/Secondary: [employer] [facility] / N/A     # Auth Visits: 6/10            Subjective: Pt reports completing his HEP this morning. Pt reports feeling like he worked out. Pt reports that the kinesiotape was a good cue for improved posture. Sudden and spikes of pain are \"a lot less\". Pt estimates only 2x since starting PT.  Pt estimates 75% improvement in overall condition.  Sitting toward the end of the day is still the primary time when complaints arise.    Pain: 1/10- pt reports this is possibly workout soreness.      Objective: See table for program.    Assessment: Good compliance with HEP and good progress in all areas of previous deficit.      Goals:   (to be met in 10 visits)   - Pt indep with HEP 5 of 7 days a week.  - Pt demonstrates good posture throughout PT session without cues from PT.  - Scapular stabilizer MMT to increase by 1/2 to full grade to allow for sitting through 1/2 of workday with min c/o at worst.  - Good flexibility of right lower trap to aide in washing dishes x 20-30 minutes with min c/o at worst.    Plan: To continue with HEP and indep management, following up with PT in 2 weeks.  Date: 10/9/24                TX#: 4/10 Date:   10/14/24              TX#: 5/10 Date: 10/30/24  Tx#: 6/10   TE: 15 min  Standing 20 lb Side bending x 10  Standing 20 lb row 2/10  Seated FB/L rot stretch 30 sec   UBE f/b lv 1 x 3 min each  Prone over SB thoraic ext x 20   NM: 15 min  Lat pull Blue TB x 20  Blue TB rows x 20   Counter push ups wide and narrow stances x 2/10 each   MM: 8 min  STM/TPR R lower trap    TE: 12 min  Standing 20 lb row 2/10  Seated FB/L rot stretch 30 sec   UBE f/b lv 1 x 3 min each  Prone over SB thoraic ext x 2/20   NM: 15  min  Lat pull Black TB x 20  Black TB rows x 20   Counter push ups wide and narrow stances x 2/10 each   MM: 15 min  STM/TPR R lower trap  Kinesiotape to thoracic paraspinals. Tape education and instruction on removal. TA: 15 min  Verbally reviewed HEP, progression and variations of exercises and sequence of completion.      MM: 25 min  STM/TPR R lower trap  Kinesiotape to thoracic paraspinals. Tape education and instruction on removal.                  HEP: Lat pull, ER, prone over pillows    Charges: MM2, TA       Total Timed Treatment: 40 min  Total Treatment Time: 40 min

## 2024-11-05 ENCOUNTER — APPOINTMENT (OUTPATIENT)
Dept: PHYSICAL THERAPY | Age: 44
End: 2024-11-05
Attending: FAMILY MEDICINE
Payer: COMMERCIAL

## 2024-11-07 ENCOUNTER — APPOINTMENT (OUTPATIENT)
Dept: PHYSICAL THERAPY | Age: 44
End: 2024-11-07
Attending: FAMILY MEDICINE
Payer: COMMERCIAL

## 2024-11-12 ENCOUNTER — APPOINTMENT (OUTPATIENT)
Dept: PHYSICAL THERAPY | Age: 44
End: 2024-11-12
Attending: FAMILY MEDICINE
Payer: COMMERCIAL

## 2024-11-14 ENCOUNTER — APPOINTMENT (OUTPATIENT)
Dept: PHYSICAL THERAPY | Age: 44
End: 2024-11-14
Attending: FAMILY MEDICINE
Payer: COMMERCIAL

## 2025-01-04 ENCOUNTER — OFFICE VISIT (OUTPATIENT)
Dept: FAMILY MEDICINE CLINIC | Facility: CLINIC | Age: 45
End: 2025-01-04
Payer: COMMERCIAL

## 2025-01-04 VITALS
HEIGHT: 68 IN | OXYGEN SATURATION: 100 % | WEIGHT: 157 LBS | DIASTOLIC BLOOD PRESSURE: 81 MMHG | HEART RATE: 86 BPM | BODY MASS INDEX: 23.79 KG/M2 | SYSTOLIC BLOOD PRESSURE: 116 MMHG | RESPIRATION RATE: 16 BRPM | TEMPERATURE: 98 F

## 2025-01-04 DIAGNOSIS — R30.0 DYSURIA: ICD-10-CM

## 2025-01-04 DIAGNOSIS — N30.01 ACUTE CYSTITIS WITH HEMATURIA: Primary | ICD-10-CM

## 2025-01-04 LAB
APPEARANCE: CLEAR
BILIRUBIN: NEGATIVE
GLUCOSE (URINE DIPSTICK): NEGATIVE MG/DL
KETONES (URINE DIPSTICK): NEGATIVE MG/DL
MULTISTIX LOT#: ABNORMAL NUMERIC
NITRITE, URINE: POSITIVE
PH, URINE: 6 (ref 4.5–8)
PROTEIN (URINE DIPSTICK): 30 MG/DL
SPECIFIC GRAVITY: 1.01 (ref 1–1.03)
URINE-COLOR: YELLOW
UROBILINOGEN,SEMI-QN: 0.2 MG/DL (ref 0–1.9)

## 2025-01-04 PROCEDURE — 87491 CHLMYD TRACH DNA AMP PROBE: CPT | Performed by: NURSE PRACTITIONER

## 2025-01-04 PROCEDURE — 87591 N.GONORRHOEAE DNA AMP PROB: CPT | Performed by: NURSE PRACTITIONER

## 2025-01-04 PROCEDURE — 87801 DETECT AGNT MULT DNA AMPLI: CPT | Performed by: NURSE PRACTITIONER

## 2025-01-04 PROCEDURE — 87088 URINE BACTERIA CULTURE: CPT | Performed by: NURSE PRACTITIONER

## 2025-01-04 PROCEDURE — 87086 URINE CULTURE/COLONY COUNT: CPT | Performed by: NURSE PRACTITIONER

## 2025-01-04 PROCEDURE — 87186 SC STD MICRODIL/AGAR DIL: CPT | Performed by: NURSE PRACTITIONER

## 2025-01-04 RX ORDER — NITROFURANTOIN 25; 75 MG/1; MG/1
100 CAPSULE ORAL 2 TIMES DAILY
Qty: 10 CAPSULE | Refills: 0 | Status: SHIPPED | OUTPATIENT
Start: 2025-01-04 | End: 2025-01-09

## 2025-01-04 NOTE — PATIENT INSTRUCTIONS
Urinalysis results reviewed in clinic.   Will treat with Macrobid, while awaiting urine culture results.   Encouraged to increase oral intake.  Wash with mild soap and water.  Tylenol/Ibuprofen for pain/fever.   Phenazopyridine (Pyridium) can be used for pain.

## 2025-01-04 NOTE — PROGRESS NOTES
CHIEF COMPLAINT:     Chief Complaint   Patient presents with    Urinary Symptoms     Started yesterday; frequency, burning and painful urination        HPI:   Walter Espino is a 44 year old male who presents with symptoms of UTI. Complaining of urinary frequency, urgency, dysuria for last 1 days. Symptoms have been unchanged since onset.  Treatments tried: Azo x 1 day.    Denies flank pain, fever, hematuria, nausea, or vomiting. Denies h/o kidney stones. Denies prostate issues. Denies nocturia or dribbling.    Current Outpatient Medications   Medication Sig Dispense Refill    Ascorbic Acid (VITAMIN C) 1000 MG Oral Tab       Magnesium Citrate 125 MG Oral Cap       omeprazole 20 MG Oral Capsule Delayed Release       Biotin 5000 MCG Oral Tab       TURMERIC OR Take 1 tablet by mouth daily.      cetirizine 10 MG Oral Tab Take 1 tablet (10 mg total) by mouth daily.      Acidophilus/Pectin Oral Cap Take 1 capsule by mouth daily.      Omega-3 Fatty Acids (FISH OIL OR) Take 1 tablet by mouth daily.      Calcium Carbonate-Vit D-Min (CALCIUM 1200 OR) Take 1 tablet by mouth daily.        Past Medical History:    Factor V Leiden (HCC)    Migraine-cluster headache syndrome    Migraines    Torsion of left testicle    surgery      Social History:  Social History     Socioeconomic History    Marital status:    Tobacco Use    Smoking status: Former     Current packs/day: 0.50     Average packs/day: 0.5 packs/day for 26.0 years (13.0 ttl pk-yrs)     Types: Cigarettes     Start date: 1999    Smokeless tobacco: Never   Vaping Use    Vaping status: Never Used   Substance and Sexual Activity    Alcohol use: Yes     Alcohol/week: 0.0 standard drinks of alcohol     Comment: beer/wine - weekly    Drug use: Yes     Types: Cannabis     Comment: edible cannabis x 1 year, smoked cannabis prior   Other Topics Concern    Caffeine Concern No         REVIEW OF SYSTEMS:   GENERAL: Denies fever, chills, or body aches  SKIN: no rashes, no  skin wounds or ulcers.  GI: See HPI. No N/V/C/D.   : See HPI.  NEURO: no headaches.    EXAM:   There were no vitals taken for this visit.  GENERAL: well developed, well nourished,in no apparent distress  CARDIO: RRR, no murmurs  LUNGS: clear to ausculation bilaterally, no wheezing or rhonchi  GI: BS present x 4.  No hepatosplenomegaly.  : Denies suprapubic tenderness. No bladder distention, or CVAT     Recent Results (from the past 24 hours)   URINALYSIS, AUTO, W/O SCOPE    Collection Time: 01/04/25 10:08 AM   Result Value Ref Range    Glucose Urine Negative Negative mg/dL    Bilirubin Urine Negative Negative    Ketones, UA Negative Negative - Trace mg/dL    Spec Gravity 1.015 1.005 - 1.030    Blood Urine Trace-intact (A) Negative    PH Urine 6.0 5.0 - 8.0    Protein Urine 30 (A) Negative - Trace mg/dL    Urobilinogen Urine 0.2 0.2 - 1.0 mg/dL    Nitrite Urine Positive (A) Negative    Leukocyte Esterase Urine Small (A) Negative    APPEARANCE clear Clear    Color Urine yellow Yellow    Multistix Lot# [id number] Numeric    Multistix Expiration Date 7/31/2025 Date         ASSESSMENT AND PLAN:   Walter Espino is a 44 year old male presents with UTI symptoms.    ASSESSMENT:  Encounter Diagnoses   Name Primary?    Acute cystitis with hematuria Yes    Dysuria        PLAN: Meds as listed below.  Comfort measures as described in Patient Instructions  Urine culture and GC/Chlamydia pending.   Meds & Refills for this Visit:  Requested Prescriptions      No prescriptions requested or ordered in this encounter       Risk and benefits of medication discussed. Stressed importance of completing full course of antibiotic unless told otherwise.     Patient Instructions   Urinalysis results reviewed in clinic.   Will treat with Macrobid, while awaiting urine culture results.   Encouraged to increase oral intake.  Wash with mild soap and water.  Tylenol/Ibuprofen for pain/fever.   Phenazopyridine (Pyridium) can be used for  pain.      The patient indicates understanding of these issues and agrees to the plan.  The patient is asked to return in 3 days if not better. Call if fever, vomiting, worsening symptoms.

## 2025-01-06 LAB
C TRACH DNA SPEC QL NAA+PROBE: NEGATIVE
N GONORRHOEA DNA SPEC QL NAA+PROBE: NEGATIVE

## (undated) DEVICE — CLIP LGT 11MM OPEN 2.8MM 235CM

## (undated) DEVICE — LINE MNTR ADLT SET O2 INTMD

## (undated) DEVICE — MEDI-VAC NON-CONDUCTIVE SUCTION TUBING 6MM X 1.8M (6FT.) L: Brand: CARDINAL HEALTH

## (undated) DEVICE — Device: Brand: DEFENDO AIR/WATER/SUCTION AND BIOPSY VALVE

## (undated) DEVICE — Device: Brand: CUSTOM PROCEDURE KIT

## (undated) NOTE — ED AVS SNAPSHOT
[facility] AND [facility] in [address] [place] [address] [address] [address]    Phone:  [phone]    Fax:  [phone]           Mr. Otto Rosen   MRN: [id number]    Department:  [facility] in [facility]ING[facility] (HERPES ZOSTER) (ENGLISH)      Disclosure     Insurance plans vary and the physician(s) referred by the Immediate Care may not be covered by your plan. It is possible that the physician may not participate in your health insurance plan.   This ma IF THERE IS ANY CHANGE OR WORSENING OF YOUR CONDITION, CALL YOUR PRIMARY CARE PHYSICIAN AT ONCE OR GO TO THE EMERGENCY DEPARTMENT.     If you have been prescribed any medication(s), please fill your prescription right away and begin taking the medication(s) you to explore options for quitting.     - If you have concerns related to behavioral health issues or thoughts of harming yourself, contact [facility] at [phone].     - If you don’t have insurance, Brandon Bajwa